# Patient Record
Sex: MALE | Race: WHITE | Employment: PART TIME | URBAN - METROPOLITAN AREA
[De-identification: names, ages, dates, MRNs, and addresses within clinical notes are randomized per-mention and may not be internally consistent; named-entity substitution may affect disease eponyms.]

---

## 2017-01-18 DIAGNOSIS — M10.9 GOUT, UNSPECIFIED: ICD-10-CM

## 2017-01-18 RX ORDER — ALLOPURINOL 100 MG/1
100 TABLET ORAL DAILY
Qty: 90 TABLET | Refills: 3 | Status: SHIPPED | OUTPATIENT
Start: 2017-01-18 | End: 2018-02-05 | Stop reason: SDUPTHER

## 2017-02-23 ENCOUNTER — OFFICE VISIT (OUTPATIENT)
Dept: FAMILY MEDICINE CLINIC | Age: 42
End: 2017-02-23

## 2017-02-23 VITALS
SYSTOLIC BLOOD PRESSURE: 130 MMHG | WEIGHT: 281 LBS | RESPIRATION RATE: 16 BRPM | DIASTOLIC BLOOD PRESSURE: 76 MMHG | HEART RATE: 80 BPM | BODY MASS INDEX: 44.1 KG/M2 | HEIGHT: 67 IN

## 2017-02-23 DIAGNOSIS — Z00.00 WELL ADULT HEALTH CHECK: Primary | ICD-10-CM

## 2017-02-23 DIAGNOSIS — E66.01 MORBID OBESITY, UNSPECIFIED OBESITY TYPE (HCC): ICD-10-CM

## 2017-02-23 DIAGNOSIS — M10.9 GOUT, UNSPECIFIED: ICD-10-CM

## 2017-02-23 PROCEDURE — 99396 PREV VISIT EST AGE 40-64: CPT | Performed by: FAMILY MEDICINE

## 2017-02-23 ASSESSMENT — ENCOUNTER SYMPTOMS
GASTROINTESTINAL NEGATIVE: 1
RESPIRATORY NEGATIVE: 1

## 2017-02-23 ASSESSMENT — PATIENT HEALTH QUESTIONNAIRE - PHQ9
SUM OF ALL RESPONSES TO PHQ9 QUESTIONS 1 & 2: 0
2. FEELING DOWN, DEPRESSED OR HOPELESS: 0
1. LITTLE INTEREST OR PLEASURE IN DOING THINGS: 0
SUM OF ALL RESPONSES TO PHQ QUESTIONS 1-9: 0

## 2018-02-07 ENCOUNTER — OFFICE VISIT (OUTPATIENT)
Dept: FAMILY MEDICINE CLINIC | Age: 43
End: 2018-02-07
Payer: MEDICARE

## 2018-02-07 VITALS
HEIGHT: 67 IN | DIASTOLIC BLOOD PRESSURE: 78 MMHG | WEIGHT: 288.6 LBS | BODY MASS INDEX: 45.3 KG/M2 | OXYGEN SATURATION: 95 % | SYSTOLIC BLOOD PRESSURE: 122 MMHG | HEART RATE: 104 BPM | RESPIRATION RATE: 20 BRPM

## 2018-02-07 DIAGNOSIS — M10.00 IDIOPATHIC GOUT, UNSPECIFIED CHRONICITY, UNSPECIFIED SITE: ICD-10-CM

## 2018-02-07 DIAGNOSIS — Z00.00 WELL ADULT HEALTH CHECK: Primary | ICD-10-CM

## 2018-02-07 PROCEDURE — 99396 PREV VISIT EST AGE 40-64: CPT | Performed by: FAMILY MEDICINE

## 2018-02-07 RX ORDER — ALLOPURINOL 100 MG/1
TABLET ORAL
Qty: 90 TABLET | Refills: 3 | Status: SHIPPED | OUTPATIENT
Start: 2018-02-07 | End: 2019-04-18 | Stop reason: SDUPTHER

## 2018-02-07 ASSESSMENT — ENCOUNTER SYMPTOMS
RESPIRATORY NEGATIVE: 1
GASTROINTESTINAL NEGATIVE: 1

## 2019-04-18 ENCOUNTER — OFFICE VISIT (OUTPATIENT)
Dept: FAMILY MEDICINE CLINIC | Age: 44
End: 2019-04-18
Payer: MEDICARE

## 2019-04-18 VITALS
SYSTOLIC BLOOD PRESSURE: 128 MMHG | RESPIRATION RATE: 16 BRPM | TEMPERATURE: 98.7 F | WEIGHT: 280.9 LBS | OXYGEN SATURATION: 96 % | DIASTOLIC BLOOD PRESSURE: 76 MMHG | HEIGHT: 67 IN | BODY MASS INDEX: 44.09 KG/M2 | HEART RATE: 92 BPM

## 2019-04-18 DIAGNOSIS — J02.9 ACUTE VIRAL PHARYNGITIS: ICD-10-CM

## 2019-04-18 DIAGNOSIS — J06.9 ACUTE URI: Primary | ICD-10-CM

## 2019-04-18 DIAGNOSIS — M10.00 IDIOPATHIC GOUT, UNSPECIFIED CHRONICITY, UNSPECIFIED SITE: ICD-10-CM

## 2019-04-18 PROCEDURE — 1036F TOBACCO NON-USER: CPT | Performed by: FAMILY MEDICINE

## 2019-04-18 PROCEDURE — G8417 CALC BMI ABV UP PARAM F/U: HCPCS | Performed by: FAMILY MEDICINE

## 2019-04-18 PROCEDURE — 99213 OFFICE O/P EST LOW 20 MIN: CPT | Performed by: FAMILY MEDICINE

## 2019-04-18 PROCEDURE — G8427 DOCREV CUR MEDS BY ELIG CLIN: HCPCS | Performed by: FAMILY MEDICINE

## 2019-04-18 RX ORDER — PREDNISONE 20 MG/1
20 TABLET ORAL 2 TIMES DAILY
Qty: 10 TABLET | Refills: 0 | Status: SHIPPED | OUTPATIENT
Start: 2019-04-18 | End: 2019-04-23

## 2019-04-18 RX ORDER — BENZONATATE 100 MG/1
100 CAPSULE ORAL 3 TIMES DAILY PRN
Qty: 15 CAPSULE | Refills: 0 | Status: SHIPPED | OUTPATIENT
Start: 2019-04-18 | End: 2019-04-23

## 2019-04-18 RX ORDER — ALLOPURINOL 100 MG/1
TABLET ORAL
Qty: 90 TABLET | Refills: 3 | Status: SHIPPED | OUTPATIENT
Start: 2019-04-18 | End: 2020-05-07

## 2019-04-18 ASSESSMENT — PATIENT HEALTH QUESTIONNAIRE - PHQ9
2. FEELING DOWN, DEPRESSED OR HOPELESS: 0
SUM OF ALL RESPONSES TO PHQ QUESTIONS 1-9: 0
SUM OF ALL RESPONSES TO PHQ QUESTIONS 1-9: 0
SUM OF ALL RESPONSES TO PHQ9 QUESTIONS 1 & 2: 0
1. LITTLE INTEREST OR PLEASURE IN DOING THINGS: 0

## 2019-04-18 ASSESSMENT — ENCOUNTER SYMPTOMS
SINUS PRESSURE: 1
COUGH: 1
GASTROINTESTINAL NEGATIVE: 1
RHINORRHEA: 1
SORE THROAT: 1

## 2019-04-18 NOTE — LETTER
[address] [place] [address].  [address] [facility] II.[facility] [address]  Phone: [phone]  Fax: [address] Spencer,         April 18, 2019     Patient: Mateo Gillespie   YOB: 1975   Date of Visit: 4/18/2019       To Whom It May Concern: It is my medical opinion that Ary Finder may return to work on 4/22/19. If you have any questions or concerns, please don't hesitate to call.     Sincerely,        Kaylyn Barboza, DO

## 2019-04-18 NOTE — PROGRESS NOTES
Visit Information    Have you changed or started any medications since your last visit including any over-the-counter medicines, vitamins, or herbal medicines? no   Are you having any side effects from any of your medications? -  no  Have you stopped taking any of your medications? Is so, why? -  no    Have you seen any other physician or provider since your last visit? No  Have you had any other diagnostic tests since your last visit? No  Have you been seen in the emergency room and/or had an admission to a hospital since we last saw you? No  Have you had your routine dental cleaning in the past 6 months? no    Have you activated your [employer] account? If not, what are your barriers?  Yes     Patient Care Team:  Lori Cancer, DO as PCP - General (Family Medicine)  Lori Cancer, DO as PCP - [facility] Attributed Provider    Medical History Review  Past Medical, Family, and Social History reviewed and does not contribute to the patient presenting condition    Health Maintenance   Topic Date Due    HIV screen  05/15/1989    DTaP/Tdap/Td vaccine (1 - Tdap) 05/15/1993    Lipid screen  05/15/2014    Diabetes screen  05/15/2014    Flu vaccine (Season Ended) 09/01/2019    Pneumococcal 0-64 years Vaccine  Aged Out

## 2019-04-18 NOTE — PATIENT INSTRUCTIONS
questions about a medical condition or this instruction, always ask your healthcare professional. [place] [address] any warranty or liability for your use of this information.

## 2019-04-18 NOTE — PROGRESS NOTES
Subjective:      Patient ID: Ramiro Viera is a 39 y.o. male. HPI:    Chief Complaint   Patient presents with    Pharyngitis     s/s x 5 days    Cough    Chest Congestion    Medication Refill     Pt here for cough, congestion for the last 4-5 days. No fevers. Denies NVD. Some chills. Taking OTC ibuprofen with some relief. Has a lot of sinus drainage. Cough is keeping him up at night. Patient Active Problem List   Diagnosis    Gout    Morbid obesity ([address]     No past surgical history on file. Prior to Admission medications    Medication Sig Start Date End Date Taking? Authorizing Provider   allopurinol (ZYLOPRIM) 100 MG tablet TAKE ONE TABLET BY MOUTH ONCE DAILY 4/18/19  Yes Lana Shaver, DO         Review of Systems   Constitutional: Positive for chills. Negative for fever. HENT: Positive for congestion, rhinorrhea, sinus pressure and sore throat. Respiratory: Positive for cough. Cardiovascular: Negative. Gastrointestinal: Negative. Musculoskeletal: Negative. All other systems reviewed and are negative. Objective:   Physical Exam   Constitutional: He is oriented to person, place, and time. He appears well-developed and well-nourished. HENT:   Head: Normocephalic and atraumatic. Right Ear: A middle ear effusion is present. Left Ear: A middle ear effusion is present. Nose: Mucosal edema and rhinorrhea present. Mouth/Throat: Mucous membranes are normal. Posterior oropharyngeal erythema present. No oropharyngeal exudate. Cardiovascular: Normal rate, regular rhythm and normal heart sounds. No murmur heard. Pulmonary/Chest: Effort normal and breath sounds normal. He has no decreased breath sounds. He has no wheezes. He has no rhonchi. Abdominal: Soft. Bowel sounds are normal.   Musculoskeletal: He exhibits no edema. Neurological: He is alert and oriented to person, place, and time. Skin: Skin is warm and dry.    Psychiatric: He has a normal mood and affect. His behavior is normal.   Nursing note and vitals reviewed. Assessment:       Diagnosis Orders   1. Acute URI  benzonatate (TESSALON) 100 MG capsule   2. Idiopathic gout, unspecified chronicity, unspecified site  allopurinol (ZYLOPRIM) 100 MG tablet   3.  Acute viral pharyngitis  predniSONE (DELTASONE) 20 MG tablet           Plan:      -  Viral nature discussed  -  Rest, fluids  -  Prednisone burst  -  Handwashing!!!  -  RTO if worsening symptoms           Latosha Connors, DO

## 2019-04-24 ENCOUNTER — TELEPHONE (OUTPATIENT)
Dept: FAMILY MEDICINE CLINIC | Age: 44
End: 2019-04-24

## 2019-04-24 RX ORDER — DOXYCYCLINE HYCLATE 100 MG
100 TABLET ORAL 2 TIMES DAILY
Qty: 14 TABLET | Refills: 0 | Status: SHIPPED | OUTPATIENT
Start: 2019-04-24 | End: 2019-05-01

## 2019-04-24 NOTE — TELEPHONE ENCOUNTER
04/24/2019 Patient mom called office saying pateint condition worsening. Medication and steroid not helping at all. Patient very weak cannot get out of bed. Offered appointment, mom stated, \"He cannot even get out of bed he is so weak from coughing\". Patient coughing up yellow phlegm along with some blood. Patient uses [facility] on [address]. Please advise yulia Reynaylella Kincaid Hipaa contact at [phone]. da

## 2019-04-29 ENCOUNTER — TELEPHONE (OUTPATIENT)
Dept: FAMILY MEDICINE CLINIC | Age: 44
End: 2019-04-29

## 2019-04-29 NOTE — LETTER
[address] [place] [address].  [address] [facility] DAV[address]OOKS[address] [facility] [address]  Phone: [phone]  Fax: [address] Spencer,         April 30, 2019     Patient: Jv Turner   YOB: 1975   Date of Visit: 4/18/19       To Whom It May Concern: It is my medical opinion that Milagros Muniz should be excused from work 4/16/19-4/19/19 as well as 4/24/19-4/25/19. .    If you have any questions or concerns, please don't hesitate to call.     Sincerely,        Ann Vieira DO

## 2019-04-29 NOTE — TELEPHONE ENCOUNTER
Patient is calling asking for a work slip for the days he had to miss for being sick, DOLV 4/18/19, he missed 4/16/19 thru 4/19/19, he thought he was able to go back to work on and then missed 4/24/19 and 4/25/19.

## 2019-04-30 NOTE — TELEPHONE ENCOUNTER
This may be related to the Sudafed, should resolve in a few days. If no improvement by next week, schedule appt so we can check a urine.

## 2019-04-30 NOTE — TELEPHONE ENCOUNTER
Patient also c/o difficulty urinating since he was sick. States his stream starts and stops and he has occasional discomfort, denies hematuria, urgency and frequency.   Please advise

## 2019-05-16 ENCOUNTER — TELEPHONE (OUTPATIENT)
Dept: FAMILY MEDICINE CLINIC | Age: 44
End: 2019-05-16

## 2019-05-16 RX ORDER — AMOXICILLIN AND CLAVULANATE POTASSIUM 875; 125 MG/1; MG/1
1 TABLET, FILM COATED ORAL 2 TIMES DAILY WITH MEALS
Qty: 14 TABLET | Refills: 0 | Status: SHIPPED | OUTPATIENT
Start: 2019-05-16 | End: 2019-05-23

## 2019-05-16 NOTE — TELEPHONE ENCOUNTER
5/16/19  Patient requesting antibiotic for  cough(brown/yellow), partial sore throat and thick sinus drainage and states he did get better, but came right back. Denied temp. Patient states he is using Allopurinol for this. Patient declined appointment.    [facility] [address]   Thanks/asia  Dolv: 4/18/19

## 2020-02-14 ENCOUNTER — HOSPITAL ENCOUNTER (OUTPATIENT)
Age: 45
Discharge: HOME | End: 2020-02-14
Payer: MEDICARE

## 2020-02-14 ENCOUNTER — OFFICE VISIT (OUTPATIENT)
Dept: FAMILY MEDICINE CLINIC | Age: 45
End: 2020-02-14
Payer: MEDICARE

## 2020-02-14 ENCOUNTER — HOSPITAL ENCOUNTER (OUTPATIENT)
Dept: GENERAL RADIOLOGY | Age: 45
Discharge: HOME | End: 2020-02-14
Payer: MEDICARE

## 2020-02-14 VITALS
SYSTOLIC BLOOD PRESSURE: 130 MMHG | RESPIRATION RATE: 16 BRPM | BODY MASS INDEX: 44.97 KG/M2 | WEIGHT: 287.1 LBS | HEART RATE: 80 BPM | DIASTOLIC BLOOD PRESSURE: 76 MMHG

## 2020-02-14 PROCEDURE — 99213 OFFICE O/P EST LOW 20 MIN: CPT | Performed by: FAMILY MEDICINE

## 2020-02-14 PROCEDURE — 73502 X-RAY EXAM HIP UNI 2-3 VIEWS: CPT

## 2020-02-14 PROCEDURE — G8484 FLU IMMUNIZE NO ADMIN: HCPCS | Performed by: FAMILY MEDICINE

## 2020-02-14 PROCEDURE — G8417 CALC BMI ABV UP PARAM F/U: HCPCS | Performed by: FAMILY MEDICINE

## 2020-02-14 PROCEDURE — 1036F TOBACCO NON-USER: CPT | Performed by: FAMILY MEDICINE

## 2020-02-14 PROCEDURE — G8427 DOCREV CUR MEDS BY ELIG CLIN: HCPCS | Performed by: FAMILY MEDICINE

## 2020-02-14 RX ORDER — PREDNISONE 20 MG/1
TABLET ORAL
Qty: 25 TABLET | Refills: 0 | Status: SHIPPED | OUTPATIENT
Start: 2020-02-14 | End: 2021-10-06

## 2020-02-14 SDOH — ECONOMIC STABILITY: INCOME INSECURITY: HOW HARD IS IT FOR YOU TO PAY FOR THE VERY BASICS LIKE FOOD, HOUSING, MEDICAL CARE, AND HEATING?: NOT HARD AT ALL

## 2020-02-14 SDOH — ECONOMIC STABILITY: FOOD INSECURITY: WITHIN THE PAST 12 MONTHS, YOU WORRIED THAT YOUR FOOD WOULD RUN OUT BEFORE YOU GOT MONEY TO BUY MORE.: NEVER TRUE

## 2020-02-14 SDOH — ECONOMIC STABILITY: TRANSPORTATION INSECURITY
IN THE PAST 12 MONTHS, HAS THE LACK OF TRANSPORTATION KEPT YOU FROM MEDICAL APPOINTMENTS OR FROM GETTING MEDICATIONS?: NO

## 2020-02-14 SDOH — ECONOMIC STABILITY: TRANSPORTATION INSECURITY
IN THE PAST 12 MONTHS, HAS LACK OF TRANSPORTATION KEPT YOU FROM MEETINGS, WORK, OR FROM GETTING THINGS NEEDED FOR DAILY LIVING?: NO

## 2020-02-14 SDOH — ECONOMIC STABILITY: FOOD INSECURITY: WITHIN THE PAST 12 MONTHS, THE FOOD YOU BOUGHT JUST DIDN'T LAST AND YOU DIDN'T HAVE MONEY TO GET MORE.: NEVER TRUE

## 2020-02-14 ASSESSMENT — PATIENT HEALTH QUESTIONNAIRE - PHQ9
SUM OF ALL RESPONSES TO PHQ QUESTIONS 1-9: 0
1. LITTLE INTEREST OR PLEASURE IN DOING THINGS: 0
2. FEELING DOWN, DEPRESSED OR HOPELESS: 0
SUM OF ALL RESPONSES TO PHQ QUESTIONS 1-9: 0
SUM OF ALL RESPONSES TO PHQ9 QUESTIONS 1 & 2: 0

## 2020-02-14 NOTE — PATIENT INSTRUCTIONS
You may receive a survey about your visit with us today. The feedback from our patients helps us identify what is working well and where the service to all patients can be enhanced. Thank you! Patient Education        Tennis Elbow: Care Instructions  Your Care Instructions    Tennis elbow is soreness or pain on the outer part of the elbow. The pain occurs when the tendon is stretched and becomes irritated by repeated twisting of the hand, wrist, and forearm. A tendon is a tough tissue that connects muscle to bone. This injury is common in tennis players. But you also can get it from many activities that work the same muscles. Examples include gardening, painting, and using tools. Tennis elbow usually heals with rest and treatment at home. Follow-up care is a key part of your treatment and safety. Be sure to make and go to all appointments, and call your doctor if you are having problems. It's also a good idea to know your test results and keep a list of the medicines you take. How can you care for yourself at home?    · Rest your fingers, wrist, and forearm. Try to stop or reduce any activity that causes elbow pain. You may have to rest your arm for weeks to months. Follow your doctor's directions for how long to rest.     · Put ice or a cold pack on your elbow for 10 to 20 minutes at a time. Try to do this every 1 to 2 hours for the next 3 days (when you are awake) or until the swelling goes down. Put a thin cloth between the ice and your skin.     · If your doctor gave you a brace or splint, use it as directed. A \"counterforce\" brace is a strap around your forearm, just below your elbow. It may ease the pressure on the tendon and spread force throughout your arm.     · Prop up your elbow on pillows to help reduce swelling.     · Follow your doctor's or physical therapist's directions for exercise.     · Return to your usual activities slowly.     · Try to prevent the problem.  Learn the best techniques for pain.  You will be told when to start these exercises and which ones will work best for you. How to do the exercises  Wrist flexor stretch   1. Extend your arm in front of you with your palm up. 2. Bend your wrist, pointing your hand toward the floor. 3. With your other hand, gently bend your wrist farther until you feel a mild to moderate stretch in your forearm. 4. Hold for at least 15 to 30 seconds. Repeat 2 to 4 times. Wrist extensor stretch   1. Repeat steps 1 to 4 of the stretch above but begin with your extended hand palm down. Ball or sock squeeze   1. Hold a tennis ball (or a rolled-up sock) in your hand. 2. Make a fist around the ball (or sock) and squeeze. 3. Hold for about 6 seconds, and then relax for up to 10 seconds. 4. Repeat 8 to 12 times. 5. Switch the ball (or sock) to your other hand and do 8 to 12 times. Wrist deviation   1. Sit so that your arm is supported but your hand hangs off the edge of a flat surface, such as a table. 2. Hold your hand out like you are shaking hands with someone. 3. Move your hand up and down. 4. Repeat this motion 8 to 12 times. 5. Switch arms. 6. Try to do this exercise twice with each hand. Wrist curls   1. Place your forearm on a table with your hand hanging over the edge of the table, palm up. 2. Place a 1- to 2-pound weight in your hand. This may be a dumbbell, a can of food, or a filled water bottle. 3. Slowly raise and lower the weight while keeping your forearm on the table and your palm facing up. 4. Repeat this motion 8 to 12 times. 5. Switch arms, and do steps 1 through 4.  6. Repeat with your hand facing down toward the floor. Switch arms. Biceps curls   1. Sit leaning forward with your legs slightly spread and your left hand on your left thigh.   2. Place your right elbow on your right thigh, and hold the weight with your forearm horizontal.  3. Slowly curl the weight up and toward your chest.  4. Repeat this motion 8 to 12 times.  5. Switch arms, and do steps 1 through 4. Follow-up care is a key part of your treatment and safety. Be sure to make and go to all appointments, and call your doctor if you are having problems. It's also a good idea to know your test results and keep a list of the medicines you take. Where can you learn more? Go to [email or web address] org and sign in to your [employer] account. Enter X550 in the [employer] box to learn more about \"Tennis Elbow: Exercises. \"     If you do not have an account, please click on the \"Sign Up Now\" link. Current as of: June 26, 2019  Content Version: 12.3  © 5085-1497 Healthwise, Incorporated. Care instructions adapted under license by [facility] [facility]). If you have questions about a medical condition or this instruction, always ask your healthcare professional. [place] [address] any warranty or liability for your use of this information. Patient Education        Piriformis Syndrome: Exercises  Introduction  Here are some examples of exercises for you to try. The exercises may be suggested for a condition or for rehabilitation. Start each exercise slowly. Ease off the exercises if you start to have pain. You will be told when to start these exercises and which ones will work best for you. How to do the exercises  Hip rotator stretch   1. Lie on your back with both knees bent and your feet flat on the floor. 2. Put the ankle of your affected leg on your opposite thigh near your knee. 3. Use your hand to gently push your knee (on your affected leg) away from your body until you feel a gentle stretch around your hip. 4. Hold the stretch for 15 to 30 seconds. 5. Repeat 2 to 4 times. 6. Switch legs and repeat steps 1 through 5. Piriformis stretch   1. Lie on your back with your legs straight. 2. Lift your affected leg and bend your knee.  With your opposite hand, reach across your body, and then gently pull your knee toward your opposite shoulder. 3. Hold the stretch for 15 to 30 seconds. 4. Repeat with your other leg. 5. Repeat 2 to 4 times on each side. Lower abdominal strengthening   1. Lie on your back with your knees bent and your feet flat on the floor. 2. Tighten your belly muscles by pulling your belly button in toward your spine. 3. Lift one foot off the floor and bring your knee toward your chest, so that your knee is straight above your hip and your leg is bent like the letter \"L. \"  4. Lift the other knee up to the same position. 5. Lower one leg at a time to the starting position. 6. Keep alternating legs until you have lifted each leg 8 to 12 times. 7. Be sure to keep your belly muscles tight and your back still as you are moving your legs. Be sure to breathe normally. Follow-up care is a key part of your treatment and safety. Be sure to make and go to all appointments, and call your doctor if you are having problems. It's also a good idea to know your test results and keep a list of the medicines you take. Current as of: June 26, 2019  Content Version: 12.3  © 4599-8510 Healthwise, Incorporated. Care instructions adapted under license by [facility] ([facility]). If you have questions about a medical condition or this instruction, always ask your healthcare professional. [address] any warranty or liability for your use of this information.

## 2020-02-17 ASSESSMENT — ENCOUNTER SYMPTOMS
RESPIRATORY NEGATIVE: 1
GASTROINTESTINAL NEGATIVE: 1

## 2020-02-19 ENCOUNTER — TELEPHONE (OUTPATIENT)
Dept: FAMILY MEDICINE CLINIC | Age: 45
End: 2020-02-19

## 2020-02-19 RX ORDER — NAPROXEN 500 MG/1
500 TABLET ORAL 2 TIMES DAILY WITH MEALS
Qty: 60 TABLET | Refills: 3 | Status: SHIPPED | OUTPATIENT
Start: 2020-02-19 | End: 2020-08-27 | Stop reason: SDUPTHER

## 2020-05-07 RX ORDER — ALLOPURINOL 100 MG/1
TABLET ORAL
Qty: 30 TABLET | Refills: 0 | Status: SHIPPED | OUTPATIENT
Start: 2020-05-07 | End: 2020-07-13

## 2020-05-26 ENCOUNTER — VIRTUAL VISIT (OUTPATIENT)
Dept: FAMILY MEDICINE CLINIC | Age: 45
End: 2020-05-26
Payer: MEDICARE

## 2020-05-26 PROCEDURE — 99213 OFFICE O/P EST LOW 20 MIN: CPT | Performed by: FAMILY MEDICINE

## 2020-05-26 PROCEDURE — G8428 CUR MEDS NOT DOCUMENT: HCPCS | Performed by: FAMILY MEDICINE

## 2020-05-26 ASSESSMENT — ENCOUNTER SYMPTOMS
GASTROINTESTINAL NEGATIVE: 1
RESPIRATORY NEGATIVE: 1

## 2020-05-26 NOTE — PROGRESS NOTES
Psychiatric:         Mood and Affect: Mood normal.         Behavior: Behavior normal.         Thought Content: Thought content normal.         Judgment: Judgment normal.         Assessment:       Diagnosis Orders   1. Femoroacetabular impingement of left hip  Sheryl Baca MD, Orthopedic Surgery, [place]   2. Primary osteoarthritis of left hip  Sheryl Baca MD, Orthopedic Surgery, [place] [address]           Plan:      Michelle s reviewed with pt  -  Refer to [address] [place] [address]. prn    Due to this being a TeleHealth encounter (During ECOAV-46 public health emergency), evaluation of the following organ systems was limited: Vitals/Constitutional/EENT/Resp/CV/GI//MS/Neuro/Skin/Heme-Lymph-Imm. Pursuant to the emergency declaration under the [address] [place] [address], [address] [place] [address] authority and the [employer] Act, this Virtual Visit was conducted with patient's (and/or legal guardian's) consent, to reduce the patient's risk of exposure to COVID-19 and provide necessary medical care. The patient (and/or legal guardian) has also been advised to contact this office for worsening conditions or problems, and seek emergency medical treatment and/or call 911 if deemed necessary. Patient identification was verified at the start of the visit: Yes    Total time spent for this encounter: Not billed by time    Services were provided through a video synchronous discussion virtually to substitute for in-person clinic visit. Patient and provider were located at their individual homes.           Kristen May DO

## 2020-08-17 ENCOUNTER — TELEPHONE (OUTPATIENT)
Dept: FAMILY MEDICINE CLINIC | Age: 45
End: 2020-08-17

## 2020-08-17 NOTE — TELEPHONE ENCOUNTER
Pt called stating he had a left hip injection without relief. Pt has low back and left leg pain, pain radiates into his left leg stopping at the knee. Numbness/tingling with sitting down, left leg gave out causing him to fall to the floor, DENIES groin pain. Pt is taking Naproxen with some minimal relief. Pt is asking for further recommendations. Please advise.

## 2020-08-27 ENCOUNTER — TELEMEDICINE (OUTPATIENT)
Dept: FAMILY MEDICINE CLINIC | Age: 45
End: 2020-08-27
Payer: MEDICARE

## 2020-08-27 ENCOUNTER — TELEPHONE (OUTPATIENT)
Dept: FAMILY MEDICINE CLINIC | Age: 45
End: 2020-08-27

## 2020-08-27 PROCEDURE — 99213 OFFICE O/P EST LOW 20 MIN: CPT | Performed by: FAMILY MEDICINE

## 2020-08-27 PROCEDURE — G8428 CUR MEDS NOT DOCUMENT: HCPCS | Performed by: FAMILY MEDICINE

## 2020-08-27 RX ORDER — AMOXICILLIN 500 MG/1
500 CAPSULE ORAL 3 TIMES DAILY
Qty: 45 CAPSULE | Refills: 0 | Status: SHIPPED | OUTPATIENT
Start: 2020-08-27 | End: 2020-09-11

## 2020-08-27 RX ORDER — NAPROXEN 500 MG/1
500 TABLET ORAL 2 TIMES DAILY WITH MEALS
Qty: 60 TABLET | Refills: 5 | Status: SHIPPED | OUTPATIENT
Start: 2020-08-27 | End: 2021-02-09

## 2020-08-27 ASSESSMENT — ENCOUNTER SYMPTOMS
GASTROINTESTINAL NEGATIVE: 1
RESPIRATORY NEGATIVE: 1

## 2020-08-27 NOTE — TELEPHONE ENCOUNTER
Pt called office left vm stating he was at the dentist over a week ago and given Amoxicillin for a tooth infection. His next appt is not until 9/8/2020 and only have 5 pills left. He is asking if you would refill this medication. Please advise.

## 2020-08-27 NOTE — PROGRESS NOTES
Subjective:      Patient ID: Theodore Mcduffie is a 54 y.o. male. HPI:    Chief Complaint   Patient presents with   [address] [place] [address] (:  1975) has requested an audio/video evaluation for the following concern(s):    Pt presents today with c/o dental issue. Has appt on the  with [facility] for tooth removal.    He was seen at [facility] for free visit and at that time she prescribed him amoxicillin for 7 days but he is concerned about the infection coming back    Patient Active Problem List   Diagnosis    Gout    Morbid [address] ([facility] [address]     No past surgical history on file. Prior to Admission medications    Medication Sig Start Date End Date Taking? Authorizing Provider   amoxicillin (AMOXIL) 500 MG capsule Take 1 capsule by mouth 3 times daily for 15 days 20 Yes Zayda Res, DO   allopurinol (ZYLOPRIM) 100 MG tablet Take 1 tablet by mouth once daily 20   Zayda Res, DO   naproxen (NAPROSYN) 500 MG tablet Take 1 tablet by mouth 2 times daily (with meals) 20   Zayda Res, DO   predniSONE (DELTASONE) 20 MG tablet Take 3 po daily for 4 days, 2 po daily for 4 days, 1 po daily for 4 days, 1/2 po daily for 2 days 20   Zayda Res, DO         Review of Systems   Constitutional: Negative. HENT: Negative. Dental pain   Respiratory: Negative. Cardiovascular: Negative. Gastrointestinal: Negative. Musculoskeletal: Negative. All other systems reviewed and are negative. Objective:   Physical Exam  Constitutional:       General: He is not in acute distress. Appearance: Normal appearance. He is well-developed. He is not ill-appearing. HENT:      Head: Normocephalic and atraumatic. Right Ear: External ear normal.      Left Ear: External ear normal.   Eyes:      Conjunctiva/sclera: Conjunctivae normal.   Pulmonary:      Effort: Pulmonary effort is normal. No respiratory distress.    Skin: Findings: No rash (on exposed surfaces). Neurological:      Mental Status: He is alert and oriented to person, place, and time. Psychiatric:         Mood and Affect: Mood normal.         Behavior: Behavior normal.         Thought Content: Thought content normal.         Judgment: Judgment normal.         Assessment:       Diagnosis Orders   1. Dental infection  amoxicillin (AMOXIL) 500 MG capsule   2.  Morbid obesity ([address]             Plan:      -  rx amoxicillin sent  -  Follow up with Dentist as scheduled  -  Once again encouraged lifestyle changes to help lower BMI  -  RTO prn        Keerthi Wayne DO

## 2020-09-14 ENCOUNTER — TELEPHONE (OUTPATIENT)
Dept: FAMILY MEDICINE CLINIC | Age: 45
End: 2020-09-14

## 2020-09-14 NOTE — TELEPHONE ENCOUNTER
Patient calling to see if Dr. Jean Claude Haynes can sign his refill for his contacts. He doesn't have an appt with Dr. Shawna Templeton for a little while and needs a refill. Advised to contact Dr Stanislaw Rivas office regarding. Patient also c/o left hip pain that is not improving with chiropractic care. Requesting to have MRI done at [facility].   Please advis

## 2020-09-14 NOTE — TELEPHONE ENCOUNTER
In regards to his hip, pt has seen Ortho for this in the past.  Recommend following up with them, they will have a better chance of getting MRI approved than we will.

## 2020-09-28 ENCOUNTER — TELEPHONE (OUTPATIENT)
Dept: FAMILY MEDICINE CLINIC | Age: 45
End: 2020-09-28

## 2020-09-28 NOTE — TELEPHONE ENCOUNTER
The patient called in and stated that he had to reschedule his dental appt and it is now 10/28/20. He stated that he will not have enough amoxil to last till that appt and is requesting a refill be sent to [facility].       If no call back the patient will check with his pharmacy after 1pm today

## 2020-10-29 NOTE — TELEPHONE ENCOUNTER
Patient notified Additional Notes: Patient consent was obtained to proceed with the visit and recommended plan of care after discussion of all risks and benefits, including the risks of COVID-19 exposure. Detail Level: Simple

## 2021-02-09 ENCOUNTER — VIRTUAL VISIT (OUTPATIENT)
Dept: FAMILY MEDICINE CLINIC | Age: 46
End: 2021-02-09
Payer: MEDICARE

## 2021-02-09 DIAGNOSIS — E66.01 MORBID OBESITY (HCC): ICD-10-CM

## 2021-02-09 DIAGNOSIS — Z00.00 WELL ADULT HEALTH CHECK: Primary | ICD-10-CM

## 2021-02-09 DIAGNOSIS — M16.12 PRIMARY OSTEOARTHRITIS OF LEFT HIP: ICD-10-CM

## 2021-02-09 DIAGNOSIS — M10.00 IDIOPATHIC GOUT, UNSPECIFIED CHRONICITY, UNSPECIFIED SITE: ICD-10-CM

## 2021-02-09 PROCEDURE — G8484 FLU IMMUNIZE NO ADMIN: HCPCS | Performed by: FAMILY MEDICINE

## 2021-02-09 PROCEDURE — 99396 PREV VISIT EST AGE 40-64: CPT | Performed by: FAMILY MEDICINE

## 2021-02-09 RX ORDER — IBUPROFEN 600 MG/1
600 TABLET ORAL EVERY 8 HOURS PRN
Qty: 90 TABLET | Refills: 2 | Status: SHIPPED | OUTPATIENT
Start: 2021-02-09 | End: 2021-06-08 | Stop reason: SDUPTHER

## 2021-02-09 NOTE — PROGRESS NOTES
Bry Rivers (:  1975) is a 54 y.o. male,Established patient, here for evaluation of the following chief complaint(s): Annual Exam      SUBJECTIVE/OBJECTIVE:  HPI:    Chief Complaint   Patient presents with    Annual Exam       Annual eval.  Doing well overall. Pt would like a refill of his naproxen, taking this multiple times per day for his left hip. This was evaluated by [facility] who injected his hip with no relief. Next step is a joint replacement which he is not thrilled about. Hx of gout, he is taking allopurinol. Due for labs, wishes to hold off at this time. Has had through work and everything looks fine. Patient Active Problem List   Diagnosis    Gout    Morbid [address] ([facility] [address]     No past surgical history on file. Prior to Admission medications    Medication Sig Start Date End Date Taking? Authorizing Provider   ibuprofen (ADVIL;MOTRIN) 600 MG tablet Take 1 tablet by mouth every 8 hours as needed for Pain 21  Yes Gilbert Sequeira DO   allopurinol (ZYLOPRIM) 100 MG tablet Take 1 tablet by mouth once daily 20   Gilbert Sequeira DO   predniSONE (DELTASONE) 20 MG tablet Take 3 po daily for 4 days, 2 po daily for 4 days, 1 po daily for 4 days, 1/2 po daily for 2 days 20   Gilbert Sequeira, DO       Review of Systems   Constitutional: Negative. HENT: Negative. Respiratory: Negative. Cardiovascular: Negative. Gastrointestinal: Negative. Musculoskeletal: Positive for arthralgias (left hip pain). All other systems reviewed and are negative. No flowsheet data found. Physical Exam  Constitutional:       General: He is not in acute distress. Appearance: Normal appearance. He is well-developed. He is not ill-appearing. HENT:      Head: Normocephalic and atraumatic.       Right Ear: External ear normal.      Left Ear: External ear normal.   Eyes:      Conjunctiva/sclera: Conjunctivae normal.   Pulmonary: Effort: Pulmonary effort is normal. No respiratory distress. Skin:     Findings: No rash (on exposed surfaces). Neurological:      Mental Status: He is alert and oriented to person, place, and time. Psychiatric:         Mood and Affect: Mood normal.         Behavior: Behavior normal.         Thought Content: Thought content normal.         Judgment: Judgment normal.         ASSESSMENT/PLAN:  1. Well adult health check  2. Primary osteoarthritis of left hip  3. Idiopathic gout, unspecified chronicity, unspecified site  4. Morbid obesity ([address]    -  Healthy lifestyle discussed  -  Weight loss encouraged  -  Continue allopurinol, no recent attacks  -  rx Motrin 600 mg prn for #2, needs replacement  -  RTO prn    Return for RTO as needed. Mikel Romeo is a 54 y.o. male being evaluated by a Virtual Visit (video visit) encounter to address concerns as mentioned above. A caregiver was present when appropriate. Due to this being a TeleHealth encounter (During WEQXX-92 public health emergency), evaluation of the following organ systems was limited: Vitals/Constitutional/EENT/Resp/CV/GI//MS/Neuro/Skin/Heme-Lymph-Imm. Pursuant to the emergency declaration under the [facility], [address] [place] [address] authority and the [employer] Act, this Virtual Visit was conducted with patient's (and/or legal guardian's) consent, to reduce the patient's risk of exposure to COVID-19 and provide necessary medical care. The patient (and/or legal guardian) has also been advised to contact this office for worsening conditions or problems, and seek emergency medical treatment and/or call 911 if deemed necessary.      Patient identification was verified at the start of the visit: Yes Services were provided through a video synchronous discussion virtually to substitute for in-person clinic visit. Patient was located at home and provider was located in office or at home. An electronic signature was used to authenticate this note.     --Nancie Guerra DO

## 2021-02-10 ASSESSMENT — ENCOUNTER SYMPTOMS
GASTROINTESTINAL NEGATIVE: 1
RESPIRATORY NEGATIVE: 1

## 2021-06-08 RX ORDER — IBUPROFEN 600 MG/1
600 TABLET ORAL EVERY 8 HOURS PRN
Qty: 90 TABLET | Refills: 2 | Status: SHIPPED | OUTPATIENT
Start: 2021-06-08 | End: 2021-11-10

## 2021-06-08 NOTE — TELEPHONE ENCOUNTER
The patient called in and is requesting a refill on his Ibuprofen 600mg tabs to be sent to [facility]. Order pended for your signature. If no call back the patient will check with his pharmacy tomorrow.

## 2021-10-06 ENCOUNTER — TELEPHONE (OUTPATIENT)
Dept: FAMILY MEDICINE CLINIC | Age: 46
End: 2021-10-06

## 2021-10-06 ENCOUNTER — VIRTUAL VISIT (OUTPATIENT)
Dept: FAMILY MEDICINE CLINIC | Age: 46
End: 2021-10-06
Payer: MEDICARE

## 2021-10-06 DIAGNOSIS — M10.00 IDIOPATHIC GOUT, UNSPECIFIED CHRONICITY, UNSPECIFIED SITE: Primary | ICD-10-CM

## 2021-10-06 PROCEDURE — G8428 CUR MEDS NOT DOCUMENT: HCPCS | Performed by: FAMILY MEDICINE

## 2021-10-06 PROCEDURE — 99213 OFFICE O/P EST LOW 20 MIN: CPT | Performed by: FAMILY MEDICINE

## 2021-10-06 RX ORDER — PREDNISONE 20 MG/1
20 TABLET ORAL 2 TIMES DAILY
Qty: 10 TABLET | Refills: 0 | Status: SHIPPED | OUTPATIENT
Start: 2021-10-06 | End: 2021-10-11

## 2021-10-06 ASSESSMENT — ENCOUNTER SYMPTOMS
GASTROINTESTINAL NEGATIVE: 1
RESPIRATORY NEGATIVE: 1

## 2021-10-06 NOTE — TELEPHONE ENCOUNTER
----- Message from Addy Garylakeisha sent at 10/6/2021 11:46 AM EDT -----  Subject: Message to Provider    QUESTIONS  Information for Provider? Pt is requesting a doctors note from the   Provider for an excuse from work. He was out due to his Gout. He was   taking the medication allopurinol (ZYLOPRIM) 100 MG tablet and missed a   few dosage which caused his gout to flare up again. He would like that   note sent to his email if possible. Email? [email or web address] or text   message? [phone]   ---------------------------------------------------------------------------  --------------  Cam Hernandez INFO  What is the best way for the office to contact you? OK to leave message on   voicemail  Preferred Call Back Phone Number? [phone]  ---------------------------------------------------------------------------  --------------  SCRIPT ANSWERS  Relationship to Patient?  Self

## 2021-10-06 NOTE — TELEPHONE ENCOUNTER
Spoke with patient he took himself off work starting 10/2/21 RTW 10/8/21 night shift. Patient would like a call back with advice. Please advise for appointment ?

## 2021-10-06 NOTE — TELEPHONE ENCOUNTER
----- Message from Hayley Chain sent at 10/6/2021  2:54 PM EDT -----  Subject: Message to Provider    QUESTIONS  Information for Provider? patient would like doctors note to be sent to   this email? [email or web address]  ---------------------------------------------------------------------------  --------------  CALL BACK INFO  What is the best way for the office to contact you? OK to leave message on   voicemail  Preferred Call Back Phone Number? [phone]  ---------------------------------------------------------------------------  --------------  SCRIPT ANSWERS  Relationship to Patient?  Self

## 2021-10-06 NOTE — PROGRESS NOTES
Ifeoma Kincaid (:  1975) is a 51 y.o. male,Established patient, here for evaluation of the following chief complaint(s): Gout            SUBJECTIVE/OBJECTIVE:  HPI:    Chief Complaint   Patient presents with    Gout       Pt presents today w/ gout flare that started on 10/2. Had recently missed a few days of his allopurinol. Taking Advil. Doing better now but still with some swelling in his foot. Patient Active Problem List   Diagnosis    Gout    Morbid obesity ([address]     No past surgical history on file. Social History     Tobacco Use    Smoking status: Former Smoker     Quit date: 2011     Years since quitting: 10.3    Smokeless tobacco: Former User     Quit date: 1997    Tobacco comment: only used for a short period of time (chew)   Substance Use Topics    Alcohol use: No    Drug use: No         Review of Systems   Constitutional: Negative. HENT: Negative. Respiratory: Negative. Cardiovascular: Negative. Gastrointestinal: Negative. Musculoskeletal: Positive for arthralgias (left foot pain) and joint swelling. All other systems reviewed and are negative. No flowsheet data found. Physical Exam  Constitutional:       General: He is not in acute distress. Appearance: Normal appearance. He is well-developed. He is not ill-appearing. HENT:      Head: Normocephalic and atraumatic. Right Ear: External ear normal.      Left Ear: External ear normal.   Eyes:      Conjunctiva/sclera: Conjunctivae normal.   Pulmonary:      Effort: Pulmonary effort is normal. No respiratory distress. Skin:     Findings: No rash (on exposed surfaces). Neurological:      Mental Status: He is alert and oriented to person, place, and time. Psychiatric:         Mood and Affect: Mood normal.         Behavior: Behavior normal.         Thought Content: Thought content normal.         Judgment: Judgment normal.       ASSESSMENT/PLAN:  1.  Idiopathic gout, unspecified chronicity, unspecified site  -     predniSONE (DELTASONE) 20 MG tablet; Take 1 tablet by mouth 2 times daily for 5 days, Disp-10 tablet, R-0Normal    -  Prednisone burst sent  -  Stressed compliance with allopurinol  -  Dietary changes discussed    Return if symptoms worsen or fail to improve. Nadja Womack, was evaluated through a synchronous (real-time) audio-video encounter. The patient (or guardian if applicable) is aware that this is a billable service. Verbal consent to proceed has been obtained within the past 12 months. The visit was conducted pursuant to the emergency declaration under the [address] [place] [address], [address] [place] [address] authority and the [facility] and [employer] Act. Patient identification was verified, and a caregiver was present when appropriate. The patient was located in a state where the provider was credentialed to provide care. An electronic signature was used to authenticate this note.     --Ji Donohue, DO

## 2021-11-10 RX ORDER — IBUPROFEN 600 MG/1
600 TABLET ORAL EVERY 8 HOURS PRN
Qty: 90 TABLET | Refills: 0 | Status: SHIPPED | OUTPATIENT
Start: 2021-11-10 | End: 2022-02-01

## 2021-12-16 ENCOUNTER — TELEPHONE (OUTPATIENT)
Dept: FAMILY MEDICINE CLINIC | Age: 46
End: 2021-12-16

## 2021-12-16 DIAGNOSIS — J22 LRTI (LOWER RESPIRATORY TRACT INFECTION): Primary | ICD-10-CM

## 2021-12-16 NOTE — TELEPHONE ENCOUNTER
Pt called office stating he has had decreased energy, cough, sweats, fever and body aches the past 2days. Pt had chills yesterday as well. Pt is not vaccinated. Pt became upset when I asked if he had been vaccinated for COVID, says \"i'ts not COVID, I don't need to be vaccinated for COVID or tested for COVID, It's not COVID, you all just automatically assume everything is COVID, if it's going to be a problem getting a Zpak, just forget it\". Pt requesting a Z-ubaldo. Please advise.

## 2021-12-16 NOTE — TELEPHONE ENCOUNTER
Viral likely, abx will not help. Rest, fluids, OTC decongestant. Schedule appt next week if still not seeing improvement. Illness typically will last 7-10 days.

## 2021-12-21 RX ORDER — AZITHROMYCIN 250 MG/1
250 TABLET, FILM COATED ORAL SEE ADMIN INSTRUCTIONS
Qty: 6 TABLET | Refills: 0 | Status: SHIPPED | OUTPATIENT
Start: 2021-12-21 | End: 2021-12-26

## 2021-12-21 NOTE — TELEPHONE ENCOUNTER
The patient called in and stated that he continues with a severe cough. He states that \"its bronchitis\" and needs a Zpak. He states that he his using a cough syrup and it helps for only a short time. The patient states that he as chest congestion and is coughing up brownish sputum. He states that he is SOB with coughing. Denies any other S/S. The patient uses [facility] Lice[facility]         If no call back the patient will check with his pharmacy today after 3pm

## 2022-02-01 RX ORDER — IBUPROFEN 600 MG/1
600 TABLET ORAL EVERY 8 HOURS PRN
Qty: 90 TABLET | Refills: 0 | Status: SHIPPED | OUTPATIENT
Start: 2022-02-01 | End: 2022-07-01

## 2022-03-29 ENCOUNTER — TELEPHONE (OUTPATIENT)
Dept: FAMILY MEDICINE CLINIC | Age: 47
End: 2022-03-29

## 2022-03-29 NOTE — TELEPHONE ENCOUNTER
----- Message from Ana Gonzalez sent at 3/28/2022  4:01 PM EDT -----  Subject: Message to Provider    QUESTIONS  Information for Provider? pt has questions regarding being placed on   disability, therapy is not working for ongoing hip issues, and mentioned   that surgery is not an option  ---------------------------------------------------------------------------  --------------  CALL BACK INFO  What is the best way for the office to contact you? OK to leave message on   voicemail  Preferred Call Back Phone Number?  [phone]  ---------------------------------------------------------------------------  --------------  SCRIPT ANSWERS  undefined

## 2022-03-30 ENCOUNTER — TELEMEDICINE (OUTPATIENT)
Dept: FAMILY MEDICINE CLINIC | Age: 47
End: 2022-03-30
Payer: MEDICARE

## 2022-03-30 DIAGNOSIS — M16.12 PRIMARY OSTEOARTHRITIS OF LEFT HIP: Primary | ICD-10-CM

## 2022-03-30 PROCEDURE — G8421 BMI NOT CALCULATED: HCPCS | Performed by: FAMILY MEDICINE

## 2022-03-30 PROCEDURE — 4004F PT TOBACCO SCREEN RCVD TLK: CPT | Performed by: FAMILY MEDICINE

## 2022-03-30 PROCEDURE — G8428 CUR MEDS NOT DOCUMENT: HCPCS | Performed by: FAMILY MEDICINE

## 2022-03-30 PROCEDURE — 99213 OFFICE O/P EST LOW 20 MIN: CPT | Performed by: FAMILY MEDICINE

## 2022-03-30 PROCEDURE — G8484 FLU IMMUNIZE NO ADMIN: HCPCS | Performed by: FAMILY MEDICINE

## 2022-03-30 ASSESSMENT — ENCOUNTER SYMPTOMS
RESPIRATORY NEGATIVE: 1
GASTROINTESTINAL NEGATIVE: 1

## 2022-03-30 NOTE — PROGRESS NOTES
Mental Status: He is alert and oriented to person, place, and time. Psychiatric:         Mood and Affect: Mood normal.         Behavior: Behavior normal.         Thought Content: Thought content normal.         Judgment: Judgment normal.     Assessment & Plan   Below is the assessment and plan developed based on review of pertinent history, physical exam, labs, studies, and medications. 1. Primary osteoarthritis of left hip    -  Pt will follow up with [facility] for further recommendations    Return for as needed. Deepa Cross, was evaluated through a synchronous (real-time) audio-video encounter. The patient (or guardian if applicable) is aware that this is a billable service, which includes applicable co-pays. This Virtual Visit was conducted with patient's (and/or legal guardian's) consent. The visit was conducted pursuant to the emergency declaration under the [facility], [address] [place] [address] waiver authority and the [employer] Act. Patient identification was verified, and a caregiver was present when appropriate. The patient was located at home in a state where the provider was licensed to provide care.        --Elida Zamora, DO

## 2022-07-01 RX ORDER — IBUPROFEN 600 MG/1
600 TABLET ORAL EVERY 8 HOURS PRN
Qty: 90 TABLET | Refills: 0 | Status: SHIPPED | OUTPATIENT
Start: 2022-07-01 | End: 2022-09-16

## 2022-08-11 DIAGNOSIS — M10.00 IDIOPATHIC GOUT, UNSPECIFIED CHRONICITY, UNSPECIFIED SITE: ICD-10-CM

## 2022-08-12 RX ORDER — ALLOPURINOL 100 MG/1
TABLET ORAL
Qty: 90 TABLET | Refills: 3 | Status: SHIPPED | OUTPATIENT
Start: 2022-08-12

## 2022-09-16 RX ORDER — IBUPROFEN 600 MG/1
600 TABLET ORAL EVERY 8 HOURS PRN
Qty: 90 TABLET | Refills: 0 | Status: SHIPPED | OUTPATIENT
Start: 2022-09-16

## 2022-11-21 RX ORDER — IBUPROFEN 600 MG/1
600 TABLET ORAL EVERY 8 HOURS PRN
Qty: 90 TABLET | Refills: 0 | Status: SHIPPED | OUTPATIENT
Start: 2022-11-21

## 2022-12-02 ENCOUNTER — HOSPITAL ENCOUNTER (EMERGENCY)
Age: 47
Discharge: HOME | End: 2022-12-02
Attending: EMERGENCY MEDICINE
Payer: MEDICAID

## 2022-12-02 ENCOUNTER — APPOINTMENT (OUTPATIENT)
Dept: GENERAL RADIOLOGY | Age: 47
End: 2022-12-02
Payer: MEDICAID

## 2022-12-02 VITALS
HEIGHT: 72 IN | SYSTOLIC BLOOD PRESSURE: 146 MMHG | TEMPERATURE: 102.9 F | OXYGEN SATURATION: 99 % | RESPIRATION RATE: 29 BRPM | DIASTOLIC BLOOD PRESSURE: 78 MMHG | RESPIRATION RATE: 29 BRPM | DIASTOLIC BLOOD PRESSURE: 78 MMHG | SYSTOLIC BLOOD PRESSURE: 146 MMHG | BODY MASS INDEX: 31.83 KG/M2 | HEART RATE: 109 BPM | WEIGHT: 235 LBS | HEIGHT: 72 IN | OXYGEN SATURATION: 99 % | HEART RATE: 109 BPM | TEMPERATURE: 102.9 F | WEIGHT: 235 LBS | BODY MASS INDEX: 31.83 KG/M2

## 2022-12-02 DIAGNOSIS — J18.9 PNEUMONIA DUE TO INFECTIOUS ORGANISM, UNSPECIFIED LATERALITY, UNSPECIFIED PART OF LUNG: Primary | ICD-10-CM

## 2022-12-02 LAB
ABSOLUTE EOS #: <0.03 K/UL (ref 0–0.44)
ABSOLUTE IMMATURE GRANULOCYTE: 0.11 K/UL (ref 0–0.3)
ABSOLUTE LYMPH #: 1.5 K/UL (ref 1.1–3.7)
ABSOLUTE MONO #: 1.18 K/UL (ref 0.1–1.2)
ALBUMIN SERPL-MCNC: 3.4 G/DL (ref 3.5–5.2)
ALBUMIN/GLOBULIN RATIO: 0.9 (ref 1–2.5)
ALP BLD-CCNC: 94 U/L (ref 40–129)
ALT SERPL-CCNC: 44 U/L (ref 5–41)
ANION GAP SERPL CALCULATED.3IONS-SCNC: 13 MMOL/L (ref 9–17)
ANION GAP: 10 MMOL/L (ref 7–16)
AST SERPL-CCNC: 44 U/L
BASOPHILS # BLD: 0 % (ref 0–2)
BASOPHILS ABSOLUTE: 0.04 K/UL (ref 0–0.2)
BILIRUB SERPL-MCNC: 0.4 MG/DL (ref 0.3–1.2)
BUN BLDV-MCNC: 10 MG/DL (ref 6–20)
CALCIUM SERPL-MCNC: 8.6 MG/DL (ref 8.6–10.4)
CHLORIDE BLD-SCNC: 94 MMOL/L (ref 98–107)
CO2: 24 MMOL/L (ref 20–31)
CREAT SERPL-MCNC: 1.15 MG/DL (ref 0.7–1.2)
EGFR, POC: >60 ML/MIN/1.73M2
EKG ATRIAL RATE: 108 BPM
EKG ATRIAL RATE: 108 BPM
EKG P AXIS: 51 DEGREES
EKG P AXIS: 51 DEGREES
EKG P-R INTERVAL: 144 MS
EKG P-R INTERVAL: 144 MS
EKG Q-T INTERVAL: 334 MS
EKG Q-T INTERVAL: 334 MS
EKG QRS DURATION: 90 MS
EKG QRS DURATION: 90 MS
EKG QTC CALCULATION (BAZETT): 447 MS
EKG QTC CALCULATION (BAZETT): 447 MS
EKG R AXIS: 8 DEGREES
EKG R AXIS: 8 DEGREES
EKG T AXIS: 61 DEGREES
EKG T AXIS: 61 DEGREES
EKG VENTRICULAR RATE: 108 BPM
EKG VENTRICULAR RATE: 108 BPM
EOSINOPHILS RELATIVE PERCENT: 0 % (ref 1–4)
GFR SERPL CREATININE-BSD FRML MDRD: >60 ML/MIN/1.73M2
GLUCOSE BLD-MCNC: 161 MG/DL (ref 74–100)
GLUCOSE BLD-MCNC: 186 MG/DL (ref 70–99)
HCO3 VENOUS: 25.6 MMOL/L (ref 22–29)
HCT VFR BLD CALC: 33 % (ref 40.7–50.3)
HEMOGLOBIN: 10.8 G/DL (ref 13–17)
IMMATURE GRANULOCYTES: 1 %
LACTIC ACID, WHOLE BLOOD: 2.6 MMOL/L (ref 0.7–2.1)
LIPASE: 242 U/L (ref 13–60)
LYMPHOCYTES # BLD: 14 % (ref 24–43)
MCH RBC QN AUTO: 26.2 PG (ref 25.2–33.5)
MCHC RBC AUTO-ENTMCNC: 32.7 G/DL (ref 28.4–34.8)
MCV RBC AUTO: 79.9 FL (ref 82.6–102.9)
MONOCYTES # BLD: 11 % (ref 3–12)
NRBC AUTOMATED: 0 PER 100 WBC
O2 SAT, VEN: 53 % (ref 60–85)
PCO2, VEN: 37.2 MM HG (ref 41–51)
PDW BLD-RTO: 14.5 % (ref 11.8–14.4)
PH VENOUS: 7.45 (ref 7.32–7.43)
PLATELET # BLD: 235 K/UL (ref 138–453)
PMV BLD AUTO: 10.2 FL (ref 8.1–13.5)
PO2, VEN: 26.8 MM HG (ref 30–50)
POC BUN: 9 MG/DL (ref 8–26)
POC CHLORIDE: 102 MMOL/L (ref 98–107)
POC CREATININE: 1.35 MG/DL (ref 0.51–1.19)
POC HEMATOCRIT: 34 % (ref 41–53)
POC HEMOGLOBIN: 11.7 G/DL (ref 13.5–17.5)
POC IONIZED CALCIUM: 1.04 MMOL/L (ref 1.15–1.33)
POC LACTIC ACID: 1.64 MMOL/L (ref 0.56–1.39)
POC POTASSIUM: 3.6 MMOL/L (ref 3.5–4.5)
POC SODIUM: 136 MMOL/L (ref 138–146)
POC TCO2: 25 MMOL/L (ref 22–30)
POSITIVE BASE EXCESS, VEN: 2 (ref 0–3)
POTASSIUM SERPL-SCNC: 3.6 MMOL/L (ref 3.7–5.3)
RBC # BLD: 4.13 M/UL (ref 4.21–5.77)
RBC # BLD: ABNORMAL 10*6/UL
SARS-COV-2, RAPID: NOT DETECTED
SEG NEUTROPHILS: 74 % (ref 36–65)
SEGMENTED NEUTROPHILS ABSOLUTE COUNT: 8.08 K/UL (ref 1.5–8.1)
SODIUM BLD-SCNC: 131 MMOL/L (ref 135–144)
SPECIMEN DESCRIPTION: NORMAL
TOTAL PROTEIN: 7.2 G/DL (ref 6.4–8.3)
TROPONIN, HIGH SENSITIVITY: 11 NG/L (ref 0–22)
WBC # BLD: 10.9 K/UL (ref 3.5–11.3)

## 2022-12-02 PROCEDURE — 2500000003 HC RX 250 WO HCPCS: Performed by: STUDENT IN AN ORGANIZED HEALTH CARE EDUCATION/TRAINING PROGRAM

## 2022-12-02 PROCEDURE — 6360000002 HC RX W HCPCS: Performed by: STUDENT IN AN ORGANIZED HEALTH CARE EDUCATION/TRAINING PROGRAM

## 2022-12-02 PROCEDURE — 87077 CULTURE AEROBIC IDENTIFY: CPT

## 2022-12-02 PROCEDURE — 87040 BLOOD CULTURE FOR BACTERIA: CPT

## 2022-12-02 PROCEDURE — 87154 CUL TYP ID BLD PTHGN 6+ TRGT: CPT

## 2022-12-02 PROCEDURE — 2580000003 HC RX 258: Performed by: STUDENT IN AN ORGANIZED HEALTH CARE EDUCATION/TRAINING PROGRAM

## 2022-12-02 PROCEDURE — 82947 ASSAY GLUCOSE BLOOD QUANT: CPT

## 2022-12-02 PROCEDURE — 87205 SMEAR GRAM STAIN: CPT

## 2022-12-02 PROCEDURE — 82803 BLOOD GASES ANY COMBINATION: CPT

## 2022-12-02 PROCEDURE — 83605 ASSAY OF LACTIC ACID: CPT

## 2022-12-02 PROCEDURE — 84484 ASSAY OF TROPONIN QUANT: CPT

## 2022-12-02 PROCEDURE — 71045 X-RAY EXAM CHEST 1 VIEW: CPT

## 2022-12-02 PROCEDURE — 6370000000 HC RX 637 (ALT 250 FOR IP): Performed by: STUDENT IN AN ORGANIZED HEALTH CARE EDUCATION/TRAINING PROGRAM

## 2022-12-02 PROCEDURE — 94640 AIRWAY INHALATION TREATMENT: CPT

## 2022-12-02 PROCEDURE — 82330 ASSAY OF CALCIUM: CPT

## 2022-12-02 PROCEDURE — 85014 HEMATOCRIT: CPT

## 2022-12-02 PROCEDURE — 2700000000 HC OXYGEN THERAPY PER DAY

## 2022-12-02 PROCEDURE — 87635 SARS-COV-2 COVID-19 AMP PRB: CPT

## 2022-12-02 PROCEDURE — 83690 ASSAY OF LIPASE: CPT

## 2022-12-02 PROCEDURE — 85025 COMPLETE CBC W/AUTO DIFF WBC: CPT

## 2022-12-02 PROCEDURE — 93005 ELECTROCARDIOGRAM TRACING: CPT | Performed by: STUDENT IN AN ORGANIZED HEALTH CARE EDUCATION/TRAINING PROGRAM

## 2022-12-02 PROCEDURE — 84520 ASSAY OF UREA NITROGEN: CPT

## 2022-12-02 PROCEDURE — 94664 DEMO&/EVAL PT USE INHALER: CPT

## 2022-12-02 PROCEDURE — 87186 SC STD MICRODIL/AGAR DIL: CPT

## 2022-12-02 PROCEDURE — 94660 CPAP INITIATION&MGMT: CPT

## 2022-12-02 PROCEDURE — A4216 STERILE WATER/SALINE, 10 ML: HCPCS | Performed by: STUDENT IN AN ORGANIZED HEALTH CARE EDUCATION/TRAINING PROGRAM

## 2022-12-02 PROCEDURE — 36415 COLL VENOUS BLD VENIPUNCTURE: CPT

## 2022-12-02 PROCEDURE — 6360000002 HC RX W HCPCS: Performed by: EMERGENCY MEDICINE

## 2022-12-02 PROCEDURE — 82565 ASSAY OF CREATININE: CPT

## 2022-12-02 PROCEDURE — 80053 COMPREHEN METABOLIC PANEL: CPT

## 2022-12-02 PROCEDURE — 80051 ELECTROLYTE PANEL: CPT

## 2022-12-02 RX ORDER — ALBUTEROL SULFATE 2.5 MG/3ML
2.5 SOLUTION RESPIRATORY (INHALATION)
Status: DISCONTINUED | OUTPATIENT
Start: 2022-12-02 | End: 2022-12-02 | Stop reason: HOSPADM

## 2022-12-02 RX ORDER — ALBUTEROL SULFATE 2.5 MG/3ML
15 SOLUTION RESPIRATORY (INHALATION)
Status: DISCONTINUED | OUTPATIENT
Start: 2022-12-02 | End: 2022-12-02 | Stop reason: HOSPADM

## 2022-12-02 RX ORDER — ONDANSETRON 2 MG/ML
4 INJECTION INTRAMUSCULAR; INTRAVENOUS ONCE
Status: COMPLETED | OUTPATIENT
Start: 2022-12-02 | End: 2022-12-02

## 2022-12-02 RX ORDER — ALBUTEROL SULFATE 2.5 MG/3ML
5 SOLUTION RESPIRATORY (INHALATION)
Status: DISCONTINUED | OUTPATIENT
Start: 2022-12-02 | End: 2022-12-02 | Stop reason: HOSPADM

## 2022-12-02 RX ORDER — IPRATROPIUM BROMIDE AND ALBUTEROL SULFATE 2.5; .5 MG/3ML; MG/3ML
1 SOLUTION RESPIRATORY (INHALATION)
Status: DISCONTINUED | OUTPATIENT
Start: 2022-12-02 | End: 2022-12-02 | Stop reason: HOSPADM

## 2022-12-02 RX ORDER — ACETAMINOPHEN 500 MG
1000 TABLET ORAL ONCE
Status: COMPLETED | OUTPATIENT
Start: 2022-12-02 | End: 2022-12-02

## 2022-12-02 RX ADMIN — ALBUTEROL SULFATE 15 MG: 2.5 SOLUTION RESPIRATORY (INHALATION) at 07:28

## 2022-12-02 RX ADMIN — ALBUTEROL SULFATE 5 MG: 2.5 SOLUTION RESPIRATORY (INHALATION) at 01:11

## 2022-12-02 RX ADMIN — IPRATROPIUM BROMIDE 0.5 MG: 0.5 SOLUTION RESPIRATORY (INHALATION) at 01:11

## 2022-12-02 RX ADMIN — ONDANSETRON 4 MG: 2 INJECTION INTRAMUSCULAR; INTRAVENOUS at 01:28

## 2022-12-02 RX ADMIN — ACETAMINOPHEN 1000 MG: 500 TABLET ORAL at 01:28

## 2022-12-02 RX ADMIN — FAMOTIDINE 20 MG: 10 INJECTION, SOLUTION INTRAVENOUS at 01:27

## 2022-12-02 RX ADMIN — IPRATROPIUM BROMIDE 0.5 MG: 0.5 SOLUTION RESPIRATORY (INHALATION) at 07:27

## 2022-12-02 ASSESSMENT — ENCOUNTER SYMPTOMS: TACHYPNEA: 1

## 2022-12-02 NOTE — ED NOTES
The following labs were labeled with appropriate pt sticker and tubed to lab:     [x] Blue     [x] Lavender   [] on ice  [x] Green/yellow  [x] Green/black [x] on ice  [] Jean Marie Glenny  [] on ice  [] Yellow  [x] Red  [] Type/ Screen  [] ABG  [] VBG    [] COVID-19 swab    [] Rapid  [] PCR  [] Flu swab  [] Peds Viral Panel     [] Urine Sample  [] Fecal Sample  [] Pelvic Cultures  [] Blood Cultures  [] X 2  [] STREP Cultures         Cara Hawley RN  12/02/22 0116

## 2022-12-02 NOTE — ED NOTES
The following labs were labeled with appropriate pt sticker and tubed to lab:     [] Blue     [] Lavender   [] on ice  [] Green/yellow  [] Green/black [] on ice  [] Tsering Walters  [] on ice  [] Yellow  [] Red  [] Type/ Screen  [] ABG  [] VBG    [x] COVID-19 swab    [x] Rapid  [] PCR  [] Flu swab  [] Peds Viral Panel     [] Urine Sample  [] Fecal Sample  [] Pelvic Cultures  [x] Blood Cultures  [x] X 2  [] STREP Cultures         Lester Curtis RN  12/02/22 0117

## 2022-12-02 NOTE — ED PROVIDER NOTES
[facility]     Emergency Department     Faculty Attestation    I performed a history and physical examination of the patient and discussed management with the resident. I have reviewed and agree with the residents findings including all diagnostic interpretations, and treatment plans as written. Any areas of disagreement are noted on the chart. I was personally present for the key portions of any procedures. I have documented in the chart those procedures where I was not present during the key portions. I have reviewed the emergency nurses triage note. I agree with the chief complaint, past medical history, past surgical history, allergies, medications, social and family history as documented unless otherwise noted below. Documentation of the HPI, Physical Exam and Medical Decision Making performed by scribkody is based on my personal performance of the HPI, PE and MDM. For Physician Assistant/ Nurse Practitioner cases/documentation I have personally evaluated this patient and have completed at least one if not all key elements of the E/M (history, physical exam, and MDM). Additional findings are as noted. Primary Care Physician: Glynn Al DO    Patient reports 5 days of cough congestion and shortness of breath difficulty breathing reports he was just in [facility] which is where he lives. And was admitted at the hospital and then discharged. He is traveling appear for work. Reports worsening symptoms more short of breath. Febrile at 102.9. And continues to have cough. History of smoking but quit 3 weeks ago. No leg swelling. No history of blood clots in the past.  On exam patient is tachypneic. And diffusely rhonchorous. Tachypneic. Getting aerosol treatment at this time productive sputum patient coughing and spitting it out. He is tachycardic. Will request records from outlying facility given recent admission versus ER visit. We will plan on chest x-ray, COVID swab antipyretic check blood cultures IV fluids. Sepsis protocol. Anticipate pneumonia. And admission to the hospital    EKG shows sinus tachycardia, with supraventricular complexes, ventricular rate of 108 MI interval of 144 QRS of 90 QT corrected of 447. No ST elevations noted Q waves noted in septal leads with septal infarct of age undetermined.     Estela Snider D.O, M.P.H  Attending Emergency Medicine Physician         Estela Snider DO  12/02/22 2800

## 2022-12-04 LAB
CULTURE: ABNORMAL
Lab: ABNORMAL
Lab: ABNORMAL
SPECIMEN DESCRIPTION: ABNORMAL
SPECIMEN DESCRIPTION: ABNORMAL

## 2022-12-05 LAB
CULTURE: ABNORMAL
Lab: ABNORMAL
SPECIMEN DESCRIPTION: ABNORMAL

## 2022-12-05 NOTE — PROGRESS NOTES
Patient presented on positive microbiology report with positive blood cultures for strep pneumoniae, patient was discharged with no antibiotics, attempted to contact patient to return to ED for further treatment. Both phone numbers on file are disconnected/ for an office that he does not work at. Cortez Nuñze D.

## 2022-12-06 LAB
ABSOLUTE EOS #: NORMAL K/UL (ref 0–0.44)
ABSOLUTE IMMATURE GRANULOCYTE: NORMAL K/UL (ref 0–0.3)
ABSOLUTE LYMPH #: NORMAL K/UL (ref 1.1–3.7)
ABSOLUTE MONO #: NORMAL K/UL (ref 0.1–1.2)
ALBUMIN SERPL-MCNC: NORMAL G/DL (ref 3.5–5.2)
ALBUMIN/GLOBULIN RATIO: NORMAL (ref 1–2.5)
ALLEN TEST: NORMAL
ALP BLD-CCNC: NORMAL U/L (ref 40–129)
ALT SERPL-CCNC: NORMAL U/L (ref 5–41)
ANION GAP SERPL CALCULATED.3IONS-SCNC: NORMAL MMOL/L (ref 9–17)
ANION GAP: NORMAL MMOL/L (ref 8–16)
AST SERPL-CCNC: NORMAL U/L
BASOPHILS # BLD: NORMAL % (ref 0–2)
BASOPHILS ABSOLUTE: NORMAL K/UL (ref 0–0.2)
BILIRUB SERPL-MCNC: NORMAL MG/DL (ref 0.3–1.2)
BUN BLDV-MCNC: NORMAL MG/DL (ref 6–20)
CALCIUM SERPL-MCNC: NORMAL MG/DL (ref 8.6–10.4)
CHLORIDE BLD-SCNC: NORMAL MMOL/L (ref 98–107)
CO2: NORMAL MMOL/L (ref 20–31)
CREAT SERPL-MCNC: NORMAL MG/DL (ref 0.7–1.2)
EGFR, POC: NORMAL ML/MIN/1.73M2
EOSINOPHILS RELATIVE PERCENT: NORMAL % (ref 1–4)
FIO2: NORMAL
GFR SERPL CREATININE-BSD FRML MDRD: NORMAL ML/MIN/1.73M2
GLUCOSE BLD-MCNC: NORMAL MG/DL (ref 70–99)
GLUCOSE BLD-MCNC: NORMAL MG/DL (ref 74–100)
HCO3 VENOUS: NORMAL MMOL/L (ref 22–29)
HCT VFR BLD CALC: NORMAL % (ref 40.7–50.3)
HEMOGLOBIN: NORMAL G/DL (ref 13–17)
IMMATURE GRANULOCYTES: NORMAL %
LACTIC ACID, WHOLE BLOOD: NORMAL MMOL/L (ref 0.7–2.1)
LIPASE: NORMAL U/L (ref 13–60)
LYMPHOCYTES # BLD: NORMAL % (ref 24–43)
MCH RBC QN AUTO: NORMAL PG (ref 25.2–33.5)
MCHC RBC AUTO-ENTMCNC: NORMAL G/DL (ref 28.4–34.8)
MCV RBC AUTO: NORMAL FL (ref 82.6–102.9)
MODE: NORMAL
MONOCYTES # BLD: NORMAL % (ref 3–12)
NEGATIVE BASE EXCESS, VEN: NORMAL (ref 0–2)
NRBC AUTOMATED: NORMAL PER 100 WBC
O2 DEVICE/FLOW/%: NORMAL
O2 SAT, VEN: NORMAL % (ref 60–85)
PATIENT TEMP: NORMAL
PCO2, VEN: NORMAL MM HG (ref 41–51)
PDW BLD-RTO: NORMAL % (ref 11.8–14.4)
PH VENOUS: NORMAL (ref 7.32–7.43)
PLATELET # BLD: NORMAL K/UL (ref 138–453)
PMV BLD AUTO: NORMAL FL (ref 8.1–13.5)
PO2, VEN: NORMAL MM HG (ref 30–50)
POC BUN: NORMAL MG/DL (ref 6–20)
POC CHLORIDE: NORMAL MMOL/L (ref 98–110)
POC CREATININE: NORMAL MG/DL (ref 0.51–1.19)
POC HEMATOCRIT: NORMAL % (ref 41–53)
POC HEMOGLOBIN: NORMAL G/DL (ref 13.5–17.5)
POC IONIZED CALCIUM: NORMAL MMOL/L (ref 1.15–1.33)
POC LACTIC ACID: NORMAL MMOL/L (ref 0.56–1.39)
POC POTASSIUM: NORMAL MMOL/L (ref 3.5–5.1)
POC SODIUM: NORMAL MMOL/L (ref 136–145)
POC TCO2: NORMAL MMOL/L (ref 20–31)
POSITIVE BASE EXCESS, VEN: NORMAL (ref 0–3)
POTASSIUM SERPL-SCNC: NORMAL MMOL/L (ref 3.7–5.3)
RBC # BLD: NORMAL 10*6/UL
RBC # BLD: NORMAL M/UL (ref 4.21–5.77)
SAMPLE SITE: NORMAL
SEG NEUTROPHILS: NORMAL % (ref 36–65)
SEGMENTED NEUTROPHILS ABSOLUTE COUNT: NORMAL K/UL (ref 1.5–8.1)
SODIUM BLD-SCNC: NORMAL MMOL/L (ref 135–144)
TOTAL CO2, VENOUS: NORMAL MMOL/L (ref 23–30)
TOTAL PROTEIN: NORMAL G/DL (ref 6.4–8.3)
TROPONIN, HIGH SENSITIVITY: NORMAL NG/L (ref 0–22)
WBC # BLD: NORMAL K/UL (ref 3.5–11.3)

## 2022-12-07 LAB
CULTURE: ABNORMAL
Lab: ABNORMAL
SPECIMEN DESCRIPTION: ABNORMAL

## 2022-12-12 LAB
SARS-COV-2, RAPID: ABNORMAL
SPECIMEN DESCRIPTION: ABNORMAL

## 2023-02-01 RX ORDER — IBUPROFEN 600 MG/1
600 TABLET ORAL EVERY 8 HOURS PRN
Qty: 90 TABLET | Refills: 0 | Status: SHIPPED | OUTPATIENT
Start: 2023-02-01

## 2023-03-10 RX ORDER — IBUPROFEN 600 MG/1
600 TABLET ORAL EVERY 8 HOURS PRN
Qty: 90 TABLET | Refills: 0 | Status: SHIPPED | OUTPATIENT
Start: 2023-03-10

## 2023-04-08 ENCOUNTER — APPOINTMENT (OUTPATIENT)
Dept: CT IMAGING | Age: 48
End: 2023-04-08
Payer: MEDICAID

## 2023-04-08 ENCOUNTER — HOSPITAL ENCOUNTER (EMERGENCY)
Age: 48
Discharge: HOME | End: 2023-04-08
Attending: EMERGENCY MEDICINE
Payer: MEDICAID

## 2023-04-08 VITALS
DIASTOLIC BLOOD PRESSURE: 88 MMHG | HEART RATE: 72 BPM | OXYGEN SATURATION: 97 % | TEMPERATURE: 97.9 F | SYSTOLIC BLOOD PRESSURE: 152 MMHG | RESPIRATION RATE: 18 BRPM | HEIGHT: 69 IN | WEIGHT: 315 LBS | BODY MASS INDEX: 46.65 KG/M2

## 2023-04-08 DIAGNOSIS — K40.20 BILATERAL INGUINAL HERNIA WITHOUT OBSTRUCTION OR GANGRENE, RECURRENCE NOT SPECIFIED: ICD-10-CM

## 2023-04-08 DIAGNOSIS — R10.13 ABDOMINAL PAIN, EPIGASTRIC: Primary | ICD-10-CM

## 2023-04-08 LAB
ALBUMIN SERPL BCG-MCNC: 4.2 G/DL (ref 3.5–5.1)
ALP SERPL-CCNC: 84 U/L (ref 38–126)
ALT SERPL W/O P-5'-P-CCNC: 17 U/L (ref 11–66)
ANION GAP SERPL CALC-SCNC: 7 MEQ/L (ref 8–16)
AST SERPL-CCNC: 18 U/L (ref 5–40)
BASOPHILS ABSOLUTE: 0 THOU/MM3 (ref 0–0.1)
BASOPHILS NFR BLD AUTO: 0.4 %
BILIRUB SERPL-MCNC: 0.3 MG/DL (ref 0.3–1.2)
BUN SERPL-MCNC: 16 MG/DL (ref 7–22)
CALCIUM SERPL-MCNC: 9.2 MG/DL (ref 8.5–10.5)
CHLORIDE SERPL-SCNC: 104 MEQ/L (ref 98–111)
CO2 SERPL-SCNC: 31 MEQ/L (ref 23–33)
CREAT SERPL-MCNC: 1 MG/DL (ref 0.4–1.2)
DEPRECATED RDW RBC AUTO: 44.5 FL (ref 35–45)
EKG ATRIAL RATE: 82 BPM
EKG P AXIS: 62 DEGREES
EKG P-R INTERVAL: 162 MS
EKG Q-T INTERVAL: 364 MS
EKG QRS DURATION: 108 MS
EKG QTC CALCULATION (BAZETT): 425 MS
EKG R AXIS: 30 DEGREES
EKG T AXIS: 59 DEGREES
EKG VENTRICULAR RATE: 82 BPM
EOSINOPHIL NFR BLD AUTO: 3.3 %
EOSINOPHILS ABSOLUTE: 0.3 THOU/MM3 (ref 0–0.4)
ERYTHROCYTE [DISTWIDTH] IN BLOOD BY AUTOMATED COUNT: 13.2 % (ref 11.5–14.5)
GFR SERPL CREATININE-BSD FRML MDRD: > 60 ML/MIN/1.73M2
GLUCOSE SERPL-MCNC: 138 MG/DL (ref 70–108)
HCT VFR BLD AUTO: 45.1 % (ref 42–52)
HGB BLD-MCNC: 15 GM/DL (ref 14–18)
IMM GRANULOCYTES # BLD AUTO: 0.03 THOU/MM3 (ref 0–0.07)
IMM GRANULOCYTES NFR BLD AUTO: 0.3 %
LIPASE SERPL-CCNC: 32.2 U/L (ref 5.6–51.3)
LYMPHOCYTES ABSOLUTE: 1.6 THOU/MM3 (ref 1–4.8)
LYMPHOCYTES NFR BLD AUTO: 17 %
MCH RBC QN AUTO: 30.4 PG (ref 26–33)
MCHC RBC AUTO-ENTMCNC: 33.3 GM/DL (ref 32.2–35.5)
MCV RBC AUTO: 91.5 FL (ref 80–94)
MONOCYTES ABSOLUTE: 0.6 THOU/MM3 (ref 0.4–1.3)
MONOCYTES NFR BLD AUTO: 6.8 %
NEUTROPHILS NFR BLD AUTO: 72.2 %
NRBC BLD AUTO-RTO: 0 /100 WBC
OSMOLALITY SERPL CALC.SUM OF ELEC: 286.5 MOSMOL/KG (ref 275–300)
PLATELET # BLD AUTO: 203 THOU/MM3 (ref 130–400)
PMV BLD AUTO: 10 FL (ref 9.4–12.4)
POTASSIUM SERPL-SCNC: 4.3 MEQ/L (ref 3.5–5.2)
PROT SERPL-MCNC: 6.9 G/DL (ref 6.1–8)
RBC # BLD AUTO: 4.93 MILL/MM3 (ref 4.7–6.1)
SEGMENTED NEUTROPHILS ABSOLUTE COUNT: 6.7 THOU/MM3 (ref 1.8–7.7)
SODIUM SERPL-SCNC: 142 MEQ/L (ref 135–145)
WBC # BLD AUTO: 9.3 THOU/MM3 (ref 4.8–10.8)

## 2023-04-08 PROCEDURE — 74177 CT ABD & PELVIS W/CONTRAST: CPT

## 2023-04-08 PROCEDURE — 83690 ASSAY OF LIPASE: CPT

## 2023-04-08 PROCEDURE — 85025 COMPLETE CBC W/AUTO DIFF WBC: CPT

## 2023-04-08 PROCEDURE — 6370000000 HC RX 637 (ALT 250 FOR IP): Performed by: EMERGENCY MEDICINE

## 2023-04-08 PROCEDURE — 93005 ELECTROCARDIOGRAM TRACING: CPT | Performed by: EMERGENCY MEDICINE

## 2023-04-08 PROCEDURE — 36415 COLL VENOUS BLD VENIPUNCTURE: CPT

## 2023-04-08 PROCEDURE — 80053 COMPREHEN METABOLIC PANEL: CPT

## 2023-04-08 PROCEDURE — 6360000004 HC RX CONTRAST MEDICATION: Performed by: EMERGENCY MEDICINE

## 2023-04-08 RX ORDER — PANTOPRAZOLE SODIUM 40 MG/1
40 TABLET, DELAYED RELEASE ORAL ONCE
Status: COMPLETED | OUTPATIENT
Start: 2023-04-08 | End: 2023-04-08

## 2023-04-08 RX ORDER — OMEPRAZOLE 20 MG/1
40 CAPSULE, DELAYED RELEASE ORAL DAILY
Qty: 28 CAPSULE | Refills: 0 | Status: SHIPPED | OUTPATIENT
Start: 2023-04-08 | End: 2023-04-22

## 2023-04-08 RX ORDER — MAGNESIUM HYDROXIDE/ALUMINUM HYDROXICE/SIMETHICONE 120; 1200; 1200 MG/30ML; MG/30ML; MG/30ML
30 SUSPENSION ORAL ONCE
Status: COMPLETED | OUTPATIENT
Start: 2023-04-08 | End: 2023-04-08

## 2023-04-08 RX ADMIN — IOPAMIDOL 80 ML: 755 INJECTION, SOLUTION INTRAVENOUS at 08:53

## 2023-04-08 RX ADMIN — PANTOPRAZOLE SODIUM 40 MG: 40 TABLET, DELAYED RELEASE ORAL at 10:29

## 2023-04-08 RX ADMIN — ALUMINUM HYDROXIDE, MAGNESIUM HYDROXIDE, AND SIMETHICONE 30 ML: 200; 200; 20 SUSPENSION ORAL at 08:47

## 2023-04-08 ASSESSMENT — PAIN DESCRIPTION - LOCATION
LOCATION: ABDOMEN
LOCATION: ABDOMEN;BACK

## 2023-04-08 ASSESSMENT — PAIN DESCRIPTION - ORIENTATION: ORIENTATION: MID;UPPER

## 2023-04-08 ASSESSMENT — PAIN SCALES - GENERAL
PAINLEVEL_OUTOF10: 6
PAINLEVEL_OUTOF10: 6

## 2023-04-08 ASSESSMENT — PAIN - FUNCTIONAL ASSESSMENT
PAIN_FUNCTIONAL_ASSESSMENT: NONE - DENIES PAIN
PAIN_FUNCTIONAL_ASSESSMENT: 0-10

## 2023-04-08 NOTE — ED PROVIDER NOTES
Normal range of motion and neck supple. Comments: Negative Jitendra's Sign bilaterally   Lymphadenopathy:      Cervical: No cervical adenopathy. Skin:     General: Skin is warm and dry. Coloration: Skin is not pale. Findings: No erythema or rash. Neurological:      Mental Status: He is alert and oriented to person, place, and time. Cranial Nerves: No cranial nerve deficit. Motor: No abnormal muscle tone. Coordination: Coordination normal.      Comments: No nystagmus   Psychiatric:         Behavior: Behavior normal.         Thought Content: Thought content normal.       FORMAL DIAGNOSTIC RESULTS     RADIOLOGY: Interpretation per the Radiologist below, if available at the time of this note (none if blank):    CT ABDOMEN PELVIS W IV CONTRAST Additional Contrast? None   Final Result       1. Bilateral inguinal hernias, right greater than left with a bladder diverticulum extending into the right hernia sac along with a short segment of sigmoid colon. There is no evidence of obstruction. 2. Colonic diverticulosis. **This report has been created using voice recognition software. It may contain minor errors which are inherent in voice recognition technology. **      Final report electronically signed by Dr. Damon Carrasco MD on 4/8/2023 9:11 AM          LABS: (none if blank)  Labs Reviewed   COMPREHENSIVE METABOLIC PANEL - Abnormal; Notable for the following components:       Result Value    Glucose 138 (*)     All other components within normal limits   ANION GAP - Abnormal; Notable for the following components:    Anion Gap 7.0 (*)     All other components within normal limits   CBC WITH AUTO DIFFERENTIAL   LIPASE   GLOMERULAR FILTRATION RATE, ESTIMATED   OSMOLALITY       (Any cultures that may have been sent were not resulted at the time of this patient visit)    [facility] / ED COURSE:   [facility]  /  ED Course as of 04/08/23 1154   Sat Apr 08, 2023   0939 Abd

## 2023-04-08 NOTE — DISCHARGE INSTRUCTIONS
Return to the Emergency Department immediately if you develop worsening pain, fever, vomiting, dark or bloody stools, or you have any other concerns. Please follow up with your primary care and GI doctor in 2-3 days. Discontinue or limit the use of anti-inflammatory mediations such as Motrin.  You may take Tylenol instead

## 2023-04-08 NOTE — ED NOTES
Patient presents to ED with complaint of upper abdominal pain radiates into back intermittently for past 4 days. Patient states he takes allopurinol and ibuprofen for chronic left hip pain. Patients denies nausea, vomiting or diarrhea. Patient states normal stool yesterday. Respirations unlabored.        Edi Wray RN  04/08/23 6119

## 2023-04-21 NOTE — TELEPHONE ENCOUNTER
Fax number to fax over work slip is [phone] Pt arrived via ambulance from [facility]. Pt states he was out drinking and had some marijuana but does not remember how he got to the hallway at the hotel. No complaints at this time. Minor scratches noted to the pt's face but he does not remember how he got them.

## 2023-04-23 LAB
EKG ATRIAL RATE: 82 BPM
EKG P AXIS: 62 DEGREES
EKG P-R INTERVAL: 162 MS
EKG Q-T INTERVAL: 364 MS
EKG QRS DURATION: 108 MS
EKG QTC CALCULATION (BAZETT): 425 MS
EKG R AXIS: 30 DEGREES
EKG T AXIS: 59 DEGREES
EKG VENTRICULAR RATE: 82 BPM

## 2024-10-03 ENCOUNTER — HOSPITAL ENCOUNTER (OUTPATIENT)
Age: 49
Discharge: HOME | End: 2024-10-03
Payer: COMMERCIAL

## 2024-10-03 ENCOUNTER — HOSPITAL ENCOUNTER (OUTPATIENT)
Dept: GENERAL RADIOLOGY | Age: 49
Discharge: HOME | End: 2024-10-03
Payer: COMMERCIAL

## 2024-10-03 DIAGNOSIS — Z02.71 ENCOUNTER FOR DISABILITY DETERMINATION: ICD-10-CM

## 2024-10-03 PROCEDURE — 73502 X-RAY EXAM HIP UNI 2-3 VIEWS: CPT

## 2025-06-20 ENCOUNTER — TELEPHONE (OUTPATIENT)
Dept: FAMILY MEDICINE CLINIC | Age: 50
End: 2025-06-20

## 2025-08-04 ENCOUNTER — TELEPHONE (OUTPATIENT)
Dept: FAMILY MEDICINE CLINIC | Age: 50
End: 2025-08-04